# Patient Record
Sex: FEMALE | Race: WHITE | Employment: UNEMPLOYED | URBAN - NONMETROPOLITAN AREA
[De-identification: names, ages, dates, MRNs, and addresses within clinical notes are randomized per-mention and may not be internally consistent; named-entity substitution may affect disease eponyms.]

---

## 2017-10-12 ENCOUNTER — TELEPHONE (OUTPATIENT)
Dept: CARDIOLOGY CLINIC | Age: 27
End: 2017-10-12

## 2017-10-12 NOTE — TELEPHONE ENCOUNTER
Pt notified normal echo results (from [facility] She is scheduled for her TILT 10/13.

## 2017-10-13 ENCOUNTER — HOSPITAL ENCOUNTER (OUTPATIENT)
Dept: CARDIAC CATH/INVASIVE PROCEDURES | Age: 27
Discharge: HOME | End: 2017-10-13
Payer: MEDICAID

## 2017-10-13 PROCEDURE — 93660 TILT TABLE EVALUATION: CPT | Performed by: INTERNAL MEDICINE

## 2017-10-13 NOTE — PROCEDURES
standing and increasing salt in diet were given. · Medication (e.g. fluorinef or others as instructed) if symptoms recurs despite these measurements, then medication can be used. Discussed with patient and Nurse. Electronically signed by Chitra Gavin MD on 10/13/2017 at 10:55 AM  Cardiology Fellow    Attending Physician Statement  I have discussed the case of Caterina Espinal including pertinent history and exam findings with the resident. I have seen and examined the patient and the key elements of the encounter have been performed by me. I agree with the assessment, plan and orders as documented by the resident With changes made to the note. Procedure performed by me.     Electronically signed by Nelda Soulier, MD on 10/13/2017 at [facility]      [phone]

## 2018-07-18 ENCOUNTER — TELEPHONE (OUTPATIENT)
Dept: FAMILY MEDICINE CLINIC | Age: 28
End: 2018-07-18

## 2018-07-18 NOTE — TELEPHONE ENCOUNTER
[address]  Dept: [phone]  Dept Fax: [phone]    Transition of Care Follow Up Phone Call      Place of admission: [facility]    Admission Date: 7/12/18    Discharge Date: 7/14/18    Admission Diagnosis: Pyelonephritis & bacteremia    Medication Changes: Came home on multiple ATB, will bring meds with her to appointment    Concerns at this time: getting released back to work    Specialist Appointments: none    TCM Appointment with PCP: 7/23/2018 at 11:30 am

## 2018-07-23 ENCOUNTER — OFFICE VISIT (OUTPATIENT)
Dept: FAMILY MEDICINE CLINIC | Age: 28
End: 2018-07-23
Payer: MEDICAID

## 2018-07-23 VITALS — WEIGHT: 128 LBS | DIASTOLIC BLOOD PRESSURE: 80 MMHG | SYSTOLIC BLOOD PRESSURE: 120 MMHG | HEART RATE: 88 BPM

## 2018-07-23 DIAGNOSIS — G89.29 CHRONIC MIDLINE LOW BACK PAIN WITHOUT SCIATICA: ICD-10-CM

## 2018-07-23 DIAGNOSIS — G89.29 CHRONIC RIGHT-SIDED LOW BACK PAIN WITHOUT SCIATICA: ICD-10-CM

## 2018-07-23 DIAGNOSIS — M54.50 CHRONIC MIDLINE LOW BACK PAIN WITHOUT SCIATICA: ICD-10-CM

## 2018-07-23 DIAGNOSIS — R12 HEARTBURN: ICD-10-CM

## 2018-07-23 DIAGNOSIS — N39.0 RECURRENT UTI: ICD-10-CM

## 2018-07-23 DIAGNOSIS — N12 PYELONEPHRITIS: Primary | ICD-10-CM

## 2018-07-23 DIAGNOSIS — M54.50 CHRONIC RIGHT-SIDED LOW BACK PAIN WITHOUT SCIATICA: ICD-10-CM

## 2018-07-23 DIAGNOSIS — Z13.31 POSITIVE DEPRESSION SCREENING: ICD-10-CM

## 2018-07-23 LAB
APPEARANCE: ABNORMAL
BACTERIA: ABNORMAL 1HPF
BILIRUBIN: ABNORMAL
BLOOD: ABNORMAL
CASTS: ABNORMAL /LPF
COLOR: YELLOW
CRYSTALS: ABNORMAL /HPF
EPITHELIAL CELLS, UA: ABNORMAL /HPF
GLUCOSE: ABNORMAL MG/DL
KETONES: ABNORMAL MG/DL
LEUKOCYTES, UA: ABNORMAL
MICROSCOPIC URINE: ABNORMAL
MUCUS: ABNORMAL /HPF
NITRITE, URINE: ABNORMAL
PH: 7 PH
PROTEIN,SCREEN: ABNORMAL MG/DL
RBC: ABNORMAL /HPF
SPECIFIC GRAVITY, URINE: 1.01 MG/DL
URINE CULTURE, ROUTINE: NORMAL
UROBILINOGEN, URINE: 0.2 MG/DL
WBC URINE: ABNORMAL
YEAST: ABNORMAL /HPF

## 2018-07-23 PROCEDURE — 99215 OFFICE O/P EST HI 40 MIN: CPT | Performed by: FAMILY MEDICINE

## 2018-07-23 PROCEDURE — G8431 POS CLIN DEPRES SCRN F/U DOC: HCPCS | Performed by: FAMILY MEDICINE

## 2018-07-23 PROCEDURE — 96160 PT-FOCUSED HLTH RISK ASSMT: CPT | Performed by: FAMILY MEDICINE

## 2018-07-23 PROCEDURE — 1111F DSCHRG MED/CURRENT MED MERGE: CPT | Performed by: FAMILY MEDICINE

## 2018-07-23 RX ORDER — DOCUSATE SODIUM 100 MG/1
1 CAPSULE ORAL DAILY PRN
Refills: 0 | COMMUNITY
Start: 2018-07-14

## 2018-07-23 RX ORDER — CIPROFLOXACIN 500 MG/1
1 TABLET, FILM COATED ORAL 2 TIMES DAILY
Refills: 0 | COMMUNITY
Start: 2018-07-14

## 2018-07-23 RX ORDER — HYDROCODONE BITARTRATE AND ACETAMINOPHEN 5; 325 MG/1; MG/1
2 TABLET ORAL PRN
Refills: 0 | COMMUNITY
Start: 2018-07-14

## 2018-07-23 RX ORDER — OMEPRAZOLE 20 MG/1
20 TABLET, DELAYED RELEASE ORAL DAILY
Qty: 30 TABLET | Refills: 3 | Status: SHIPPED | OUTPATIENT
Start: 2018-07-23

## 2018-07-23 RX ORDER — SALSALATE 500 MG
500 TABLET ORAL 2 TIMES DAILY
Qty: 60 TABLET | Refills: 1 | Status: SHIPPED | OUTPATIENT
Start: 2018-07-23

## 2018-07-23 ASSESSMENT — PATIENT HEALTH QUESTIONNAIRE - PHQ9
4. FEELING TIRED OR HAVING LITTLE ENERGY: 0
1. LITTLE INTEREST OR PLEASURE IN DOING THINGS: 2
2. FEELING DOWN, DEPRESSED OR HOPELESS: 1
7. TROUBLE CONCENTRATING ON THINGS, SUCH AS READING THE NEWSPAPER OR WATCHING TELEVISION: 0
6. FEELING BAD ABOUT YOURSELF - OR THAT YOU ARE A FAILURE OR HAVE LET YOURSELF OR YOUR FAMILY DOWN: 1
9. THOUGHTS THAT YOU WOULD BE BETTER OFF DEAD, OR OF HURTING YOURSELF: 0
10. IF YOU CHECKED OFF ANY PROBLEMS, HOW DIFFICULT HAVE THESE PROBLEMS MADE IT FOR YOU TO DO YOUR WORK, TAKE CARE OF THINGS AT HOME, OR GET ALONG WITH OTHER PEOPLE: 1
3. TROUBLE FALLING OR STAYING ASLEEP: 3
8. MOVING OR SPEAKING SO SLOWLY THAT OTHER PEOPLE COULD HAVE NOTICED. OR THE OPPOSITE, BEING SO FIGETY OR RESTLESS THAT YOU HAVE BEEN MOVING AROUND A LOT MORE THAN USUAL: 0
SUM OF ALL RESPONSES TO PHQ9 QUESTIONS 1 & 2: 3
5. POOR APPETITE OR OVEREATING: 3
SUM OF ALL RESPONSES TO PHQ QUESTIONS 1-9: 10

## 2018-07-23 ASSESSMENT — ENCOUNTER SYMPTOMS
ABDOMINAL DISTENTION: 1
CONSTIPATION: 0

## 2018-07-23 NOTE — PROGRESS NOTES
[address] [address] [address]  Dept: [phone]  Dept Fax: [phone]    Transition of Care Office Visit    Pt reports prior was to come to follow cardiology and did not come  Having some back pains on right persisting  Lost 1-2 days per pt when very ill. Date of Face-to-Face: 7/23/2018    Persons at visit: self    Here for follow after hospitalization for: pylonephritis 7-11-18 to 7-14-18    Activity: activity as tolerated    Any medication changes since post-hospitalization phone call? No    Any treatment changes since post-hospitalization phone call? No, completed outpatient cipro for 10 day course    Any further education needed on medications/treatment plan? No      Current Medication List  Outpatient Encounter Prescriptions as of 7/23/2018   Medication Sig Dispense Refill    DOCQLACE 100 MG capsule 1 capsule daily as needed  0    HYDROcodone-acetaminophen (NORCO) 5-325 MG per tablet 2 tablets as needed. Clerance Riis 0    ciprofloxacin (CIPRO) 500 MG tablet 1 tablet 2 times daily  0    omeprazole (PRILOSEC OTC) 20 MG tablet Take 1 tablet by mouth daily 30 tablet 3    salsalate (DISALCID) 500 MG tablet Take 1 tablet by mouth 2 times daily 60 tablet 1     No facility-administered encounter medications on file as of 7/23/2018. Medication Reconciliation  Provider has reviewed medication record and it has been modified as necessary to accurately depict current medications since hospitalization. Stacy Heath has been instructed on these changes. See transitional care telephone note. HPI:     HPI     Follow up from UTI and abdominal pain hospitalization    Review of Systems:     Review of Systems   Gastrointestinal: Positive for abdominal distention (right side into back, says its better but still there. this was her only s/s). Negative for constipation. Frequent heartburn per pt   Genitourinary: Negative for dysuria.

## 2018-07-23 NOTE — PATIENT INSTRUCTIONS
To take salsalate until pain gone plus 5 days for back pain  Complete omeprazole until follow up visit. Encourage follow up here in 2 months and with psychiatry regarding life stressors.

## 2018-07-25 DIAGNOSIS — N39.0 RECURRENT UTI: ICD-10-CM
